# Patient Record
Sex: FEMALE | Race: WHITE | NOT HISPANIC OR LATINO | Employment: FULL TIME | URBAN - METROPOLITAN AREA
[De-identification: names, ages, dates, MRNs, and addresses within clinical notes are randomized per-mention and may not be internally consistent; named-entity substitution may affect disease eponyms.]

---

## 2020-07-20 ENCOUNTER — OFFICE VISIT (OUTPATIENT)
Dept: URGENT CARE | Facility: CLINIC | Age: 50
End: 2020-07-20
Payer: COMMERCIAL

## 2020-07-20 VITALS
DIASTOLIC BLOOD PRESSURE: 90 MMHG | RESPIRATION RATE: 16 BRPM | OXYGEN SATURATION: 100 % | SYSTOLIC BLOOD PRESSURE: 136 MMHG | HEART RATE: 135 BPM | WEIGHT: 203 LBS | TEMPERATURE: 100.1 F

## 2020-07-20 DIAGNOSIS — L03.116 CELLULITIS OF LEFT LOWER EXTREMITY: Primary | ICD-10-CM

## 2020-07-20 PROCEDURE — 99203 OFFICE O/P NEW LOW 30 MIN: CPT | Performed by: FAMILY MEDICINE

## 2020-07-20 RX ORDER — CETIRIZINE HYDROCHLORIDE 10 MG/1
10 TABLET ORAL DAILY
COMMUNITY

## 2020-07-20 RX ORDER — AMOXICILLIN 500 MG/1
500 TABLET, FILM COATED ORAL 2 TIMES DAILY
COMMUNITY

## 2020-07-20 RX ORDER — LEVOTHYROXINE SODIUM 0.15 MG/1
150 TABLET ORAL DAILY
COMMUNITY

## 2020-07-20 RX ORDER — QUETIAPINE FUMARATE 25 MG/1
25 TABLET, FILM COATED ORAL
COMMUNITY

## 2020-07-20 NOTE — PROGRESS NOTES
[address] [employer]        NAME: Tristen Jasmine is a 54 y o  female  : 1970    MRN: [id number]  DATE: 2020  TIME: 7:52 PM    Assessment and Plan   Cellulitis of left lower extremity [L03 116]  1  Cellulitis of left lower extremity  mupirocin (BACTROBAN) 2 % ointment     Wound looks like a skin burn surrounded by a ring of well demarcated, deep-red erythrasma  Instructed to continue course of amoxicillin which covers for MSSA  Will prescribe topical mupirocin which may add coverage for MRSA  Also encouraged to apply ice frequently  Patient Instructions     Follow up with PCP in 3-5 days  Proceed to  ER if symptoms worsen  Chief Complaint     Chief Complaint   Patient presents with    Wound Check     pt presents with a wound behind left knee; started  afternoon, went to PCP today/ wound worsening: pt given ABX for lymes         History of Present Illness       73-year-old female presents today with a painful wound on the back of her left knee which she 1st noticed yesterday  Since then, the wound has progressively worsened in size and pain  Was evaluated by her PCP this morning who started on amoxicillin for presumed Lyme disease  However on presentation currently, the wound has appeared to developed central swelling reminiscent of a skin burn  Has already taken 2 doses of amoxicillin  Review of Systems   Review of Systems   Constitutional: Negative for chills and fever  Respiratory: Negative for cough and shortness of breath  Cardiovascular: Negative for chest pain  Gastrointestinal: Negative for abdominal pain and nausea  Skin: Positive for color change, rash and wound  Neurological: Negative for dizziness and headaches           Current Medications       Current Outpatient Medications:     amoxicillin (AMOXIL) 500 MG tablet, Take 500 mg by mouth 2 (two) times a day, Disp: , Rfl:     cetirizine (ZyrTEC) 10 mg tablet, Take 10 mg by mouth daily, Disp: , Rfl:    levothyroxine 150 mcg tablet, Take 150 mcg by mouth daily, Disp: , Rfl:     QUEtiapine (SEROquel) 25 mg tablet, Take 25 mg by mouth daily at bedtime, Disp: , Rfl:     mupirocin (BACTROBAN) 2 % ointment, Apply topically 3 (three) times a day, Disp: 22 g, Rfl: 0    Current Allergies     Allergies as of 07/20/2020 - Reviewed 07/20/2020   Allergen Reaction Noted    Doxycycline  07/20/2020            The following portions of the patient's history were reviewed and updated as appropriate: allergies, current medications, past family history, past medical history, past social history, past surgical history and problem list      Past Medical History:   Diagnosis Date    Disease of thyroid gland     Hypertension        Past Surgical History:   Procedure Laterality Date    WISDOM TOOTH EXTRACTION         History reviewed  No pertinent family history  Medications have been verified  Objective   /90   Pulse (!) 135   Temp 100 1 °F (37 8 °C)   Resp 16   Wt 92 1 kg (203 lb)   LMP 07/17/2020   SpO2 100%        Physical Exam     Physical Exam   Constitutional: She is oriented to person, place, and time  She appears well-developed and well-nourished  She appears distressed (Antalgic gait)  HENT:   Head: Normocephalic and atraumatic  Eyes: Conjunctivae are normal  Right eye exhibits no discharge  Left eye exhibits no discharge  Pulmonary/Chest: Effort normal    Neurological: She is alert and oriented to person, place, and time  Skin: Skin is warm  She is not diaphoretic  There is erythema  Indurated bulla surrounded by well demarcated erythema within the crease of left posterior knee  Has the appearance of a burn  Tender to palpation  Psychiatric: She has a normal mood and affect  Her behavior is normal  Judgment and thought content normal    Nursing note and vitals reviewed

## 2020-07-28 DIAGNOSIS — L03.116 CELLULITIS OF LEFT LOWER EXTREMITY: ICD-10-CM
